# Patient Record
Sex: MALE | Race: BLACK OR AFRICAN AMERICAN | Employment: PART TIME | URBAN - METROPOLITAN AREA
[De-identification: names, ages, dates, MRNs, and addresses within clinical notes are randomized per-mention and may not be internally consistent; named-entity substitution may affect disease eponyms.]

---

## 2020-04-17 ENCOUNTER — HOSPITAL ENCOUNTER (EMERGENCY)
Age: 25
Discharge: HOME | End: 2020-04-17

## 2020-04-17 VITALS
RESPIRATION RATE: 16 BRPM | OXYGEN SATURATION: 98 % | SYSTOLIC BLOOD PRESSURE: 134 MMHG | DIASTOLIC BLOOD PRESSURE: 87 MMHG | BODY MASS INDEX: 35.84 KG/M2 | HEART RATE: 83 BPM | TEMPERATURE: 98.4 F | WEIGHT: 256 LBS | HEIGHT: 71 IN

## 2020-04-17 LAB
BACTERIA: NEGATIVE /HPF
BILIRUBIN URINE: NEGATIVE
BLOOD, URINE: ABNORMAL
CLARITY: CLEAR
COLOR: YELLOW
EPITHELIAL CELLS, UA: ABNORMAL /HPF (ref 0–5)
GLUCOSE URINE: NEGATIVE MG/DL
HYALINE CASTS: ABNORMAL /HPF (ref 0–5)
KETONES, URINE: NEGATIVE MG/DL
LEUKOCYTE ESTERASE, URINE: ABNORMAL
NITRITE, URINE: NEGATIVE
PH UA: 7 (ref 5–9)
PROTEIN UA: NEGATIVE MG/DL
RBC UA: ABNORMAL /HPF (ref 0–5)
SPECIFIC GRAVITY UA: 1.01 (ref 1–1.03)
URINE REFLEX TO CULTURE: YES
UROBILINOGEN, URINE: 1 E.U./DL
WBC UA: >100 /HPF (ref 0–5)

## 2020-04-17 PROCEDURE — 96372 THER/PROPH/DIAG INJ SC/IM: CPT

## 2020-04-17 PROCEDURE — 87491 CHLMYD TRACH DNA AMP PROBE: CPT

## 2020-04-17 PROCEDURE — 99282 EMERGENCY DEPT VISIT SF MDM: CPT

## 2020-04-17 PROCEDURE — 87086 URINE CULTURE/COLONY COUNT: CPT

## 2020-04-17 PROCEDURE — 6360000002 HC RX W HCPCS: Performed by: PHYSICIAN ASSISTANT

## 2020-04-17 PROCEDURE — 81001 URINALYSIS AUTO W/SCOPE: CPT

## 2020-04-17 PROCEDURE — 87661 TRICHOMONAS VAGINALIS AMPLIF: CPT

## 2020-04-17 PROCEDURE — 87591 N.GONORRHOEAE DNA AMP PROB: CPT

## 2020-04-17 PROCEDURE — 6370000000 HC RX 637 (ALT 250 FOR IP): Performed by: PHYSICIAN ASSISTANT

## 2020-04-17 RX ORDER — CIPROFLOXACIN 500 MG/1
500 TABLET, FILM COATED ORAL 2 TIMES DAILY
Qty: 20 TABLET | Refills: 0 | Status: SHIPPED | OUTPATIENT
Start: 2020-04-18 | End: 2020-04-28

## 2020-04-17 RX ORDER — CEFTRIAXONE SODIUM 250 MG/1
250 INJECTION, POWDER, FOR SOLUTION INTRAMUSCULAR; INTRAVENOUS ONCE
Status: COMPLETED | OUTPATIENT
Start: 2020-04-17 | End: 2020-04-17

## 2020-04-17 RX ORDER — METRONIDAZOLE 500 MG/1
2000 TABLET ORAL ONCE
Status: COMPLETED | OUTPATIENT
Start: 2020-04-17 | End: 2020-04-17

## 2020-04-17 RX ORDER — AZITHROMYCIN 250 MG/1
1000 TABLET, FILM COATED ORAL ONCE
Status: COMPLETED | OUTPATIENT
Start: 2020-04-17 | End: 2020-04-17

## 2020-04-17 RX ADMIN — CEFTRIAXONE SODIUM 250 MG: 250 INJECTION, POWDER, FOR SOLUTION INTRAMUSCULAR; INTRAVENOUS at 16:18

## 2020-04-17 RX ADMIN — AZITHROMYCIN MONOHYDRATE 1000 MG: 250 TABLET ORAL at 16:18

## 2020-04-17 RX ADMIN — METRONIDAZOLE 2000 MG: 500 TABLET, FILM COATED ORAL at 16:18

## 2020-04-17 ASSESSMENT — ENCOUNTER SYMPTOMS
GASTROINTESTINAL NEGATIVE: 1
RESPIRATORY NEGATIVE: 1
EYES NEGATIVE: 1

## 2020-04-17 NOTE — ED TRIAGE NOTES
Patient presents to the ER with concerns for a STD  Patient states that he had unprotected sex a few days ago and woke up with penile discharge and painful urination  Patient states that he had a STD in 2014 and he is presenting the same today.

## 2020-04-17 NOTE — ED NOTES
Patient given DC instructions education and scripts  Educated on proper use of medication  Up with steady gait at time of DC  Denies having any questions      Darryl Rankin RN  04/17/20 9683

## 2020-04-17 NOTE — ED PROVIDER NOTES
 Smokeless tobacco: Never Used   Substance and Sexual Activity    Alcohol use: Yes     Comment: socially    Drug use: Not Currently    Sexual activity: Yes     Partners: Female   Lifestyle    Physical activity     Days per week: None     Minutes per session: None    Stress: None   Relationships    Social connections     Talks on phone: None     Gets together: None     Attends Mandaen service: None     Active member of club or organization: None     Attends meetings of clubs or organizations: None     Relationship status: None    Intimate partner violence     Fear of current or ex partner: None     Emotionally abused: None     Physically abused: None     Forced sexual activity: None   Other Topics Concern    None   Social History Narrative    None       SCREENINGS      @FLOW([id number])@      PHYSICAL EXAM    (up to 7 for level 4, 8 or more for level 5)     ED Triage Vitals [04/17/20 1527]   BP Temp Temp Source Pulse Resp SpO2 Height Weight   134/87 98.4 °F (36.9 °C) Oral 83 16 98 % 5' 11\" (1.803 m) 256 lb (116.1 kg)       Physical Exam  Constitutional:       General: He is not in acute distress. Appearance: He is well-developed. HENT:      Head: Normocephalic and atraumatic. Eyes:      Conjunctiva/sclera: Conjunctivae normal.      Pupils: Pupils are equal, round, and reactive to light. Neck:      Musculoskeletal: Normal range of motion and neck supple. Cardiovascular:      Rate and Rhythm: Normal rate and regular rhythm. Heart sounds: No murmur. Pulmonary:      Effort: No respiratory distress. Breath sounds: Normal breath sounds. No wheezing or rales. Abdominal:      General: There is no distension. Palpations: Abdomen is soft. Tenderness: There is no abdominal tenderness. Genitourinary:     Comments: Exam deferred  Musculoskeletal: Normal range of motion. Skin:     General: Skin is warm and dry. Findings: No erythema or rash.    Neurological:      Mental Status: He is alert and oriented to person, place, and time. Cranial Nerves: No cranial nerve deficit. Psychiatric:         Judgment: Judgment normal.           All other labs were within normal range or not returned as of this dictation. EMERGENCY DEPARTMENT COURSE and DIFFERENTIALDIAGNOSIS/MDM:   Vitals:    Vitals:    04/17/20 1527   BP: 134/87   Pulse: 83   Resp: 16   Temp: 98.4 °F (36.9 °C)   TempSrc: Oral   SpO2: 98%   Weight: 256 lb (116.1 kg)   Height: 5' 11\" (1.803 m)        Patient checked for gonorrhea, chlamydia, and trichomonas. Patient treated with Rocephin, Zithromax, and Flagyl while in the emergency room. He is advised to avoid all sexual activity until symptoms resolve. Health department for HIV testing now and again in 6 months. UA reveals greater than 100 white cells. Patient will be written for Cipro to begin tomorrow. Follow-up with urology for reevaluation and treatment. Return here if symptoms worsen or if new concerning symptoms arise. He verbalized understanding of plan at discharge and has no further questions. PROCEDURES:  Unless otherwise noted below, none     Procedures      FINAL IMPRESSION      1. Urethritis    2.  Acute cystitis without hematuria          DISPOSITION/PLAN   DISPOSITION Decision To Discharge 04/17/2020 04:39:08 PM          Luanne Ibarra PA-C (electronically signed)  Attending Emergency Physician  [address] [place], [place] [address]WILIAN  04/17/20 6608

## 2020-04-19 LAB — URINE CULTURE, ROUTINE: NORMAL

## 2020-04-20 LAB
SPECIMEN SOURCE: NORMAL
T. VAGINALIS AMPLIFIED: NEGATIVE

## 2020-04-21 LAB
C. TRACHOMATIS DNA ,URINE: POSITIVE
N. GONORRHOEAE DNA, URINE: POSITIVE

## 2020-05-21 ENCOUNTER — HOSPITAL ENCOUNTER (EMERGENCY)
Age: 25
Discharge: HOME | End: 2020-05-21

## 2020-05-21 ENCOUNTER — APPOINTMENT (OUTPATIENT)
Dept: GENERAL RADIOLOGY | Age: 25
End: 2020-05-21

## 2020-05-21 VITALS
OXYGEN SATURATION: 96 % | WEIGHT: 256 LBS | HEART RATE: 124 BPM | HEIGHT: 72 IN | DIASTOLIC BLOOD PRESSURE: 74 MMHG | SYSTOLIC BLOOD PRESSURE: 120 MMHG | TEMPERATURE: 98.1 F | RESPIRATION RATE: 16 BRPM | BODY MASS INDEX: 34.67 KG/M2

## 2020-05-21 PROCEDURE — 73562 X-RAY EXAM OF KNEE 3: CPT

## 2020-05-21 PROCEDURE — 6370000000 HC RX 637 (ALT 250 FOR IP): Performed by: PHYSICIAN ASSISTANT

## 2020-05-21 PROCEDURE — 99283 EMERGENCY DEPT VISIT LOW MDM: CPT

## 2020-05-21 RX ORDER — ETODOLAC 400 MG/1
400 TABLET, FILM COATED ORAL 2 TIMES DAILY
Qty: 14 TABLET | Refills: 0 | Status: SHIPPED | OUTPATIENT
Start: 2020-05-21 | End: 2020-07-13

## 2020-05-21 RX ORDER — NAPROXEN 500 MG/1
500 TABLET ORAL ONCE
Status: COMPLETED | OUTPATIENT
Start: 2020-05-21 | End: 2020-05-21

## 2020-05-21 RX ADMIN — NAPROXEN 500 MG: 500 TABLET ORAL at 20:12

## 2020-05-21 ASSESSMENT — PAIN SCALES - GENERAL
PAINLEVEL_OUTOF10: 0
PAINLEVEL_OUTOF10: 9

## 2020-05-21 ASSESSMENT — PAIN DESCRIPTION - ORIENTATION: ORIENTATION: LEFT

## 2020-05-21 ASSESSMENT — ENCOUNTER SYMPTOMS
EYE PAIN: 0
APNEA: 0
SHORTNESS OF BREATH: 0
TROUBLE SWALLOWING: 0
COLOR CHANGE: 0
ALLERGIC/IMMUNOLOGIC NEGATIVE: 1
ABDOMINAL PAIN: 0

## 2020-05-21 ASSESSMENT — PAIN DESCRIPTION - LOCATION: LOCATION: KNEE

## 2020-05-21 ASSESSMENT — PAIN DESCRIPTION - DESCRIPTORS: DESCRIPTORS: ACHING

## 2020-05-21 ASSESSMENT — PAIN DESCRIPTION - PAIN TYPE: TYPE: ACUTE PAIN

## 2020-07-13 ENCOUNTER — HOSPITAL ENCOUNTER (OUTPATIENT)
Dept: ORTHOPEDIC SURGERY | Age: 25
Discharge: HOME | End: 2020-07-15

## 2020-07-13 ENCOUNTER — OFFICE VISIT (OUTPATIENT)
Dept: ORTHOPEDIC SURGERY | Age: 25
End: 2020-07-13

## 2020-07-13 VITALS
HEIGHT: 72 IN | TEMPERATURE: 97.5 F | BODY MASS INDEX: 34.67 KG/M2 | WEIGHT: 256 LBS | HEART RATE: 81 BPM | RESPIRATION RATE: 16 BRPM | OXYGEN SATURATION: 96 %

## 2020-07-13 PROCEDURE — 99203 OFFICE O/P NEW LOW 30 MIN: CPT | Performed by: ORTHOPAEDIC SURGERY

## 2020-07-13 PROCEDURE — 73564 X-RAY EXAM KNEE 4 OR MORE: CPT

## 2020-07-13 PROCEDURE — 73564 X-RAY EXAM KNEE 4 OR MORE: CPT | Performed by: ORTHOPAEDIC SURGERY

## 2020-07-13 RX ORDER — HYDROCODONE BITARTRATE AND ACETAMINOPHEN 5; 325 MG/1; MG/1
TABLET ORAL
COMMUNITY
Start: 2020-07-07 | End: 2020-12-16

## 2020-07-13 RX ORDER — IBUPROFEN 600 MG/1
TABLET ORAL
COMMUNITY
Start: 2020-07-07

## 2020-07-13 ASSESSMENT — ENCOUNTER SYMPTOMS
SHORTNESS OF BREATH: 0
ABDOMINAL PAIN: 0
SORE THROAT: 0

## 2020-07-13 NOTE — PROGRESS NOTES
file     Emotionally abused: Not on file     Physically abused: Not on file     Forced sexual activity: Not on file   Other Topics Concern    Not on file   Social History Narrative    Not on file     No family history on file. No Known Allergies  Current Outpatient Medications on File Prior to Visit   Medication Sig Dispense Refill    HYDROcodone-acetaminophen (NORCO) 5-325 MG per tablet take 1 tablet by mouth every 6 hours if needed for pain      ibuprofen (ADVIL;MOTRIN) 600 MG tablet take 1 tablet by mouth every 8 hours if needed for pain       No current facility-administered medications on file prior to visit. Review of Systems   Constitutional: Negative for fever. HENT: Negative for sore throat. Eyes: Negative for visual disturbance. Respiratory: Negative for shortness of breath. Cardiovascular: Negative for chest pain. Gastrointestinal: Negative for abdominal pain. Genitourinary: Negative for difficulty urinating. Skin: Negative for rash. Neurological: Negative for headaches. Hematological: Does not bruise/bleed easily. Objective:   Pulse 81   Temp 97.5 °F (36.4 °C) (Temporal)   Resp 16   Ht 5' 11.5\" (1.816 m)   Wt 256 lb (116.1 kg)   SpO2 96%   BMI 35.21 kg/m²     Ortho Exam  Is an oriented young man in the mild distress secondary to pain left knee plus effusion in the left knee. Range of motion -10 degrees terminal extension to flexion of 45. Significantly guarding the knee. There is pain of the patellar region. Pain on the medial lateral joint line. Anterior drawer has a slight laxity although is difficult to determine with a Lockman test because he is guarding a fair amount no posterior drawer. No MCL LCL laxity. There is global tenderness medially no palpable masses or cords posteriorly no calf tenderness negative roll test left hip.     Radiographs and Laboratory Studies:     Diagnostic Imaging Studies:    Xr Knee Left (min 4 Views)    Result Date:

## 2020-07-30 ENCOUNTER — HOSPITAL ENCOUNTER (OUTPATIENT)
Dept: MRI IMAGING | Age: 25
Discharge: HOME | End: 2020-08-01

## 2020-07-30 PROCEDURE — 73721 MRI JNT OF LWR EXTRE W/O DYE: CPT

## 2020-12-16 ENCOUNTER — HOSPITAL ENCOUNTER (EMERGENCY)
Age: 25
Discharge: HOME | End: 2020-12-16

## 2020-12-16 VITALS
SYSTOLIC BLOOD PRESSURE: 138 MMHG | RESPIRATION RATE: 16 BRPM | DIASTOLIC BLOOD PRESSURE: 86 MMHG | TEMPERATURE: 97.6 F | HEART RATE: 67 BPM | OXYGEN SATURATION: 97 % | HEIGHT: 71 IN | BODY MASS INDEX: 34.44 KG/M2 | WEIGHT: 246 LBS

## 2020-12-16 PROCEDURE — 87661 TRICHOMONAS VAGINALIS AMPLIF: CPT

## 2020-12-16 PROCEDURE — 2580000003 HC RX 258

## 2020-12-16 PROCEDURE — 6370000000 HC RX 637 (ALT 250 FOR IP): Performed by: NURSE PRACTITIONER

## 2020-12-16 PROCEDURE — 87591 N.GONORRHOEAE DNA AMP PROB: CPT

## 2020-12-16 PROCEDURE — 99283 EMERGENCY DEPT VISIT LOW MDM: CPT

## 2020-12-16 PROCEDURE — 87491 CHLMYD TRACH DNA AMP PROBE: CPT

## 2020-12-16 PROCEDURE — 6360000002 HC RX W HCPCS: Performed by: NURSE PRACTITIONER

## 2020-12-16 PROCEDURE — 96372 THER/PROPH/DIAG INJ SC/IM: CPT

## 2020-12-16 RX ORDER — AZITHROMYCIN 250 MG/1
1000 TABLET, FILM COATED ORAL ONCE
Status: COMPLETED | OUTPATIENT
Start: 2020-12-16 | End: 2020-12-16

## 2020-12-16 RX ORDER — METRONIDAZOLE 500 MG/1
2000 TABLET ORAL ONCE
Status: COMPLETED | OUTPATIENT
Start: 2020-12-16 | End: 2020-12-16

## 2020-12-16 RX ORDER — CEFTRIAXONE SODIUM 250 MG/1
250 INJECTION, POWDER, FOR SOLUTION INTRAMUSCULAR; INTRAVENOUS ONCE
Status: COMPLETED | OUTPATIENT
Start: 2020-12-16 | End: 2020-12-16

## 2020-12-16 RX ADMIN — CEFTRIAXONE SODIUM 250 MG: 250 INJECTION, POWDER, FOR SOLUTION INTRAMUSCULAR; INTRAVENOUS at 15:47

## 2020-12-16 RX ADMIN — METRONIDAZOLE 2000 MG: 500 TABLET ORAL at 15:47

## 2020-12-16 RX ADMIN — WATER 10 ML: 1 INJECTION INTRAMUSCULAR; INTRAVENOUS; SUBCUTANEOUS at 15:48

## 2020-12-16 RX ADMIN — AZITHROMYCIN MONOHYDRATE 1000 MG: 250 TABLET ORAL at 15:47

## 2020-12-16 ASSESSMENT — ENCOUNTER SYMPTOMS
COUGH: 0
SHORTNESS OF BREATH: 0
ABDOMINAL PAIN: 0
BACK PAIN: 0

## 2020-12-16 NOTE — DISCHARGE INSTR - COC
Continuity of Care Form    Patient Name: Catalina Lanza   :  1995  MRN:  [id number]    Admit date:  2020  Discharge date:  ***    Code Status Order: No Order   Advance Directives:     Admitting Physician:  No admitting provider for patient encounter. PCP: No primary care provider on file. Discharging Nurse: [facility] Unit/Room#:   Discharging Unit Phone Number: ***    Emergency Contact:   Extended Emergency Contact Information  Primary Emergency Contact: [address] [facility] Phone: [phone]  Relation: Other    Past Surgical History:  History reviewed. No pertinent surgical history. Immunization History: There is no immunization history on file for this patient. Active Problems: There is no problem list on file for this patient.       Isolation/Infection:   Isolation          No Isolation        Patient Infection Status     None to display          Nurse Assessment:  Last Vital Signs: /86   Pulse 67   Temp 97.6 °F (36.4 °C) (Oral)   Resp 16   Ht 5' 11\" (1.803 m)   Wt 246 lb (111.6 kg)   SpO2 97%   BMI 34.31 kg/m²     Last documented pain score (0-10 scale):    Last Weight:   Wt Readings from Last 1 Encounters:   20 246 lb (111.6 kg)     Mental Status:  {IP PT MENTAL STATUS:}    IV Access:  { FRANCO IV ACCESS:200480788}    Nursing Mobility/ADLs:  Walking   {CHP DME AEDY:967203461}  Transfer  {CHP DME HDPY:685520718}  Bathing  {CHP DME GLNL:736613836}  Dressing  {CHP DME CHRISTINE:009570963}  Toileting  {CHP DME XRIR:406525794}  Feeding  {CHP DME DMSN:798289655}  Med Admin  {CHP DME RDJD:985216531}  Med Delivery   { FRANCO MED Delivery:512779595}    Wound Care Documentation and Therapy:        Elimination:  Continence:   · Bowel: {YES / RT:51256}  · Bladder: {YES / FL:79633}  Urinary Catheter: {Urinary Catheter:502984670}   Colostomy/Ileostomy/Ileal Conduit: {YES / EX:10721}       Date of Last BM: ***  No intake or output data in the 24 hours ending 20 1526  No intake/output data recorded.     Safety Concerns:     [address] Safety Concerns:923061930}    Impairments/Disabilities:      [address] GAMEZ Impairments/Disabilities:167167776}    Nutrition Therapy:  Current Nutrition Therapy:   Lucas GAMEZ Diet List:426868030}    Routes of Feeding: {CHP DME Other Feedings:475516331}  Liquids: {Slp liquid thickness:54128}  Daily Fluid Restriction: {CHP DME Yes amt example:727158980}  Last Modified Barium Swallow with Video (Video Swallowing Test): {Done Not Done VJFQ:761773627}    Treatments at the Time of Hospital Discharge:   Respiratory Treatments: ***  Oxygen Therapy:  {Therapy; copd oxygen:56645}  Ventilator:    { CC Vent QSXE:376983477}    Rehab Therapies: {THERAPEUTIC INTERVENTION:4840190816}  Weight Bearing Status/Restrictions: Lucas Esquivel  Weight Bearin}  Other Medical Equipment (for information only, NOT a DME order):  {EQUIPMENT:868647444}  Other Treatments: ***    Patient's personal belongings (please select all that are sent with patient):  {CHP DME Belongings:318949598}    RN SIGNATURE:  {Esignature:597532451}    CASE MANAGEMENT/SOCIAL WORK SECTION    Inpatient Status Date: ***    Readmission Risk Assessment Score:  Readmission Risk              Risk of Unplanned Readmission:        0           Discharging to Facility/ Agency   · Name:   · Address:  · Phone:  · Fax:    Dialysis Facility (if applicable)   · Name:  · Address:  · Dialysis Schedule:  · Phone:  · Fax:    / signature: {Esignature:934227959}    PHYSICIAN SECTION    Prognosis: {Prognosis:6475205481}    Condition at Discharge: [address] Esquivel Patient Condition:458033846}    Rehab Potential (if transferring to Rehab): {Prognosis:5468743048}    Recommended Labs or Other Treatments After Discharge: ***    Physician Certification: I certify the above information and transfer of Dmitry Ling  is necessary for the continuing treatment of the diagnosis listed and that he requires {Admit to Appropriate Level of Care:19782} for {GREATER/LESS:846944704} 30 days.      Update Admission H&P: {CHP DME Changes in SKVDR:534530959}    PHYSICIAN SIGNATURE:  {Esignature:168615012}

## 2020-12-16 NOTE — ED TRIAGE NOTES
Patient here for STD testing, states his partner told him she has \"trich\". Patient denies any symptoms.

## 2020-12-16 NOTE — ED PROVIDER NOTES
[address] [facility] ED  eMERGENCY dEPARTMENT eNCOUnter      Pt Name: Rosalinda Moran  MRN: [id number]  [place] 1995  Date of evaluation: 12/16/2020  Provider: EPIFANIO De Los Santos CNP      HISTORY OF PRESENT ILLNESS    Rosalinda Moran is a 30 y.o. male who presents to the Emergency Department with concern for STD. Patient's girlfriend told him she had trichomonas. He denies any symptoms: penile drainage, penile lesion, dysuria, frequency. No pain. REVIEW OF SYSTEMS       Review of Systems   Constitutional: Negative for activity change and fever. HENT: Negative for congestion. Respiratory: Negative for cough and shortness of breath. Cardiovascular: Negative for chest pain. Gastrointestinal: Negative for abdominal pain. Genitourinary: Negative for decreased urine volume, difficulty urinating, discharge, dysuria, enuresis, frequency, genital sores, hematuria, penile pain, penile swelling, scrotal swelling, testicular pain and urgency. Musculoskeletal: Negative for arthralgias and back pain. Skin: Negative for rash. All other systems reviewed and are negative. PAST MEDICAL HISTORY   History reviewed. No pertinent past medical history. SURGICAL HISTORY     History reviewed. No pertinent surgical history. CURRENT MEDICATIONS       Previous Medications    IBUPROFEN (ADVIL;MOTRIN) 600 MG TABLET    take 1 tablet by mouth every 8 hours if needed for pain       ALLERGIES     Patient has no known allergies. FAMILY HISTORY     History reviewed. No pertinent family history.        SOCIAL HISTORY       Social History     Socioeconomic History    Marital status: Single     Spouse name: None    Number of children: None    Years of education: None    Highest education level: None   Occupational History    None   Social Needs    Financial resource strain: None    Food insecurity     Worry: None     Inability: None    Transportation needs     Medical: None     Non-medical: None Tobacco Use    Smoking status: Former Smoker    Smokeless tobacco: Never Used   Substance and Sexual Activity    Alcohol use: Yes     Comment: socially    Drug use: Not Currently    Sexual activity: Yes     Partners: Female   Lifestyle    Physical activity     Days per week: None     Minutes per session: None    Stress: None   Relationships    Social connections     Talks on phone: None     Gets together: None     Attends Roman Catholic service: None     Active member of club or organization: None     Attends meetings of clubs or organizations: None     Relationship status: None    Intimate partner violence     Fear of current or ex partner: None     Emotionally abused: None     Physically abused: None     Forced sexual activity: None   Other Topics Concern    None   Social History Narrative    None       SCREENINGS      @FLOW([id number])@      PHYSICAL EXAM    (up to 7 for level 4, 8 or more for level 5)     ED Triage Vitals [12/16/20 1421]   BP Temp Temp Source Pulse Resp SpO2 Height Weight   138/86 97.6 °F (36.4 °C) Oral 67 16 97 % 5' 11\" (1.803 m) 246 lb (111.6 kg)       Physical Exam  Vitals signs and nursing note reviewed. Constitutional:       Appearance: He is well-developed. HENT:      Head: Normocephalic and atraumatic. Right Ear: External ear normal.      Left Ear: External ear normal.   Eyes:      Conjunctiva/sclera: Conjunctivae normal.      Pupils: Pupils are equal, round, and reactive to light. Neck:      Musculoskeletal: Normal range of motion and neck supple. Cardiovascular:      Rate and Rhythm: Normal rate and regular rhythm. Pulmonary:      Effort: Pulmonary effort is normal.      Breath sounds: Normal breath sounds. Abdominal:      General: Bowel sounds are normal. There is no distension. Palpations: Abdomen is soft. Tenderness: There is no abdominal tenderness. Genitourinary:      Musculoskeletal: Normal range of motion.    Skin:     General: Skin is warm and dry.   Neurological:      Mental Status: He is alert and oriented to person, place, and time. Deep Tendon Reflexes: Reflexes are normal and symmetric. Psychiatric:         Judgment: Judgment normal.           All other labs were within normal range or not returned as of this dictation. EMERGENCY DEPARTMENT COURSE and DIFFERENTIALDIAGNOSIS/MDM:   Vitals:    Vitals:    12/16/20 1421   BP: 138/86   Pulse: 67   Resp: 16   Temp: 97.6 °F (36.4 °C)   TempSrc: Oral   SpO2: 97%   Weight: 246 lb (111.6 kg)   Height: 5' 11\" (1.803 m)            30 yr old male with STD exposure. F/U With PCP in 3 days for results. Patient verbalizes understanding. PROCEDURES:  Unless otherwise noted below, none     Procedures      FINAL IMPRESSION      1.  Exposure to sexually transmitted disease (STD)          DISPOSITION/PLAN   DISPOSITION Discharge - Pending Orders Complete 12/16/2020 02:56:55 PM          EPIFANIO Cornejo CNP (electronically signed)  Attending Emergency Physician     EPIFANIO Cornejo CNP  12/16/20 5048

## 2020-12-18 LAB
SPECIMEN SOURCE: NORMAL
T. VAGINALIS AMPLIFIED: NEGATIVE

## 2020-12-22 LAB
C. TRACHOMATIS DNA ,URINE: NEGATIVE
N. GONORRHOEAE DNA, URINE: NEGATIVE

## 2021-02-09 ENCOUNTER — HOSPITAL ENCOUNTER (EMERGENCY)
Age: 26
Discharge: HOME | End: 2021-02-09

## 2021-02-09 VITALS
WEIGHT: 254.8 LBS | OXYGEN SATURATION: 99 % | SYSTOLIC BLOOD PRESSURE: 132 MMHG | DIASTOLIC BLOOD PRESSURE: 68 MMHG | RESPIRATION RATE: 20 BRPM | TEMPERATURE: 98.7 F | BODY MASS INDEX: 39.99 KG/M2 | HEIGHT: 67 IN | HEART RATE: 73 BPM

## 2021-02-09 DIAGNOSIS — S89.92XA INJURY OF LEFT KNEE, INITIAL ENCOUNTER: ICD-10-CM

## 2021-02-09 DIAGNOSIS — N34.2 URETHRITIS: Primary | ICD-10-CM

## 2021-02-09 DIAGNOSIS — Z02.89 ENCOUNTER TO OBTAIN EXCUSE FROM WORK: ICD-10-CM

## 2021-02-09 PROCEDURE — 87591 N.GONORRHOEAE DNA AMP PROB: CPT

## 2021-02-09 PROCEDURE — 6360000002 HC RX W HCPCS: Performed by: STUDENT IN AN ORGANIZED HEALTH CARE EDUCATION/TRAINING PROGRAM

## 2021-02-09 PROCEDURE — 99283 EMERGENCY DEPT VISIT LOW MDM: CPT

## 2021-02-09 PROCEDURE — 2580000003 HC RX 258

## 2021-02-09 PROCEDURE — 96372 THER/PROPH/DIAG INJ SC/IM: CPT

## 2021-02-09 PROCEDURE — 87491 CHLMYD TRACH DNA AMP PROBE: CPT

## 2021-02-09 PROCEDURE — 6370000000 HC RX 637 (ALT 250 FOR IP): Performed by: STUDENT IN AN ORGANIZED HEALTH CARE EDUCATION/TRAINING PROGRAM

## 2021-02-09 RX ORDER — METRONIDAZOLE 500 MG/1
2000 TABLET ORAL ONCE
Status: COMPLETED | OUTPATIENT
Start: 2021-02-09 | End: 2021-02-09

## 2021-02-09 RX ORDER — CEFTRIAXONE SODIUM 250 MG/1
250 INJECTION, POWDER, FOR SOLUTION INTRAMUSCULAR; INTRAVENOUS ONCE
Status: COMPLETED | OUTPATIENT
Start: 2021-02-09 | End: 2021-02-09

## 2021-02-09 RX ORDER — AZITHROMYCIN 250 MG/1
1000 TABLET, FILM COATED ORAL ONCE
Status: COMPLETED | OUTPATIENT
Start: 2021-02-09 | End: 2021-02-09

## 2021-02-09 RX ADMIN — AZITHROMYCIN MONOHYDRATE 1000 MG: 250 TABLET ORAL at 18:39

## 2021-02-09 RX ADMIN — CEFTRIAXONE SODIUM 250 MG: 250 INJECTION, POWDER, FOR SOLUTION INTRAMUSCULAR; INTRAVENOUS at 18:39

## 2021-02-09 RX ADMIN — METRONIDAZOLE 2000 MG: 500 TABLET ORAL at 18:39

## 2021-02-09 RX ADMIN — WATER 10 ML: 1 INJECTION INTRAMUSCULAR; INTRAVENOUS; SUBCUTANEOUS at 18:40

## 2021-02-09 NOTE — ED TRIAGE NOTES
Patient arrived from home via self with complaint of having drainage from penis and needing a work note. patient A&OX4.

## 2021-02-10 ASSESSMENT — ENCOUNTER SYMPTOMS
ABDOMINAL PAIN: 0
SHORTNESS OF BREATH: 0
PHOTOPHOBIA: 0
WHEEZING: 0
VOMITING: 0
NAUSEA: 0
COUGH: 0

## 2021-02-11 NOTE — ED PROVIDER NOTES
[address] [facility] ED  EMERGENCY DEPARTMENT ENCOUNTER      Pt Name: Lenore Perez  MRN: [id number]  [place] 1995  Date of evaluation: 2/9/2021  Provider: Haley Forman PA-C    CHIEF COMPLAINT       Chief Complaint   Patient presents with    Exposure to STD     penile drainage.  Letter for School/Work         HISTORY OF PRESENT ILLNESS   (Location/Symptom, Timing/Onset, Context/Setting, Quality, Duration, Modifying Factors, Severity)  Note limiting factors. Lenore Perez is a 30 y.o. male who per chart review has no pmhx presents to the emergency department to obtain work excuse and with penile discharge, dysuria. Pt states that last week he injured his knee and had to call off work. His next shift is Friday and they told him he needed a letter to return. He states the knee feels completely better. He also reports thick, white penile discharge and dysuria for the last few days. 3 female partner, no protection. States his partner was tested a few days ago and is waiting results. No hx of similar. He denies fever chills abd or pelvic pain urinary urgency or frequency back or flank pain lesions pruritis erythema testicular pain blood in the urine. HPI    Nursing Notes were reviewed. REVIEW OF SYSTEMS    (2-9 systems for level 4, 10 or more for level 5)     Review of Systems   Constitutional: Negative for chills and fever. HENT: Negative for congestion. Eyes: Negative for photophobia. Respiratory: Negative for cough, shortness of breath and wheezing. Cardiovascular: Negative for chest pain and palpitations. Gastrointestinal: Negative for abdominal pain, nausea and vomiting. Genitourinary: Positive for discharge and dysuria. Negative for decreased urine volume, difficulty urinating, flank pain, frequency, hematuria, penile pain, penile swelling, scrotal swelling, testicular pain and urgency. Musculoskeletal: Negative for myalgias.    Allergic/Immunologic: Negative for immunocompromised state.   Neurological: Negative for dizziness, weakness and headaches. All other systems reviewed and are negative. Except as noted above the remainder of the review of systems was reviewed and negative. PAST MEDICAL HISTORY   History reviewed. No pertinent past medical history. SURGICAL HISTORY     History reviewed. No pertinent surgical history. CURRENT MEDICATIONS       Discharge Medication List as of 2/9/2021  6:37 PM      CONTINUE these medications which have NOT CHANGED    Details   ibuprofen (ADVIL;MOTRIN) 600 MG tablet take 1 tablet by mouth every 8 hours if needed for painHistorical Med             ALLERGIES     Patient has no known allergies. FAMILY HISTORY     History reviewed. No pertinent family history.        SOCIAL HISTORY       Social History     Socioeconomic History    Marital status: Single     Spouse name: None    Number of children: None    Years of education: None    Highest education level: None   Occupational History    None   Social Needs    Financial resource strain: None    Food insecurity     Worry: None     Inability: None    Transportation needs     Medical: None     Non-medical: None   Tobacco Use    Smoking status: Former Smoker    Smokeless tobacco: Never Used   Substance and Sexual Activity    Alcohol use: Yes     Comment: socially    Drug use: Not Currently    Sexual activity: Yes     Partners: Female   Lifestyle    Physical activity     Days per week: None     Minutes per session: None    Stress: None   Relationships    Social connections     Talks on phone: None     Gets together: None     Attends Sabianist service: None     Active member of club or organization: None     Attends meetings of clubs or organizations: None     Relationship status: None    Intimate partner violence     Fear of current or ex partner: None     Emotionally abused: None     Physically abused: None     Forced sexual activity: None   Other Topics Concern    None Social History Narrative    None       SCREENINGS                        PHYSICAL EXAM    (up to 7 for level 4, 8 or more for level 5)     ED Triage Vitals [02/09/21 1725]   BP Temp Temp Source Pulse Resp SpO2 Height Weight   132/68 98.7 °F (37.1 °C) Oral 73 20 99 % 5' 7\" (1.702 m) 254 lb 12.8 oz (115.6 kg)       Physical Exam  Constitutional:       General: He is not in acute distress. Appearance: He is well-developed. He is not ill-appearing, toxic-appearing or diaphoretic. HENT:      Head: Normocephalic and atraumatic. Nose: Nose normal.      Mouth/Throat:      Mouth: Mucous membranes are moist.   Eyes:      Pupils: Pupils are equal, round, and reactive to light. Neck:      Musculoskeletal: Normal range of motion. Cardiovascular:      Rate and Rhythm: Normal rate and regular rhythm. Heart sounds: No murmur. No friction rub. No gallop. Pulmonary:      Effort: Pulmonary effort is normal.      Breath sounds: Normal breath sounds. No wheezing or rales. Abdominal:      General: Bowel sounds are normal. There is no distension. Palpations: Abdomen is soft. Tenderness: There is no abdominal tenderness. Genitourinary:     Comments: Exam refused   Musculoskeletal:         General: No swelling. Left knee: Normal.   Skin:     General: Skin is warm and dry. Capillary Refill: Capillary refill takes less than 2 seconds. Findings: No rash. Neurological:      General: No focal deficit present. Mental Status: He is alert and oriented to person, place, and time.          DIAGNOSTIC RESULTS     EKG: All EKG's are interpreted by the Emergency Department Physician who either signs or Co-signs this chart in the absence of a cardiologist.      RADIOLOGY:   Non-plain film images such as CT, Ultrasound and MRI are read by the radiologist. Plain radiographic images are visualized and preliminarily interpreted by the emergency physician with the below findings:    Interpretation per the Radiologist below, if available at the time of this note:    No orders to display         ED BEDSIDE ULTRASOUND:   Performed by ED Physician - none    LABS:  Labs Reviewed   C.TRACHOMATIS N.GONORRHOEAE DNA, URINE       All other labs were within normal range or not returned as of this dictation. EMERGENCY DEPARTMENT COURSE and DIFFERENTIAL DIAGNOSIS/MDM:   Vitals:    Vitals:    02/09/21 1725   BP: 132/68   Pulse: 73   Resp: 20   Temp: 98.7 °F (37.1 °C)   TempSrc: Oral   SpO2: 99%   Weight: 254 lb 12.8 oz (115.6 kg)   Height: 5' 7\" (1.702 m)       MDM     Pt is a 29 yo M who presents to the ED with dysuria, penile discharge and to obtain work excuse. He is afebrile and hemodynamically stable. Pt given ppx STI treatment with IM rocephin, po azithro and po metronidazole in the ED. Urine GC pending. Pt tolerated abx well. Non toxic appearing with stable vitals, stable for discharge. Will be contacted with GC results. Encouraged to remain abstinent until results communicated and cleared by physician. Encouraged to have all partners tested and treated. F/u with pcp 1 week. Return to the ED for worsening sx, given warning signs for which he should return. Pt understands and agrees to plan, all questions answered. REASSESSMENT          CRITICAL CARE TIME   Total Critical Care time was 0 minutes, excluding separately reportable procedures. There was a high probability of clinically significant/life threatening deterioration in the patient's condition which required my urgent intervention. CONSULTS:  None    PROCEDURES:  Unless otherwise noted below, none     Procedures        FINAL IMPRESSION      1. Urethritis    2. Encounter to obtain excuse from work    3.  Injury of left knee, initial encounter          DISPOSITION/PLAN   DISPOSITION Decision To Discharge 02/09/2021 07:01:51 PM      PATIENT REFERRED TO:  [facility]) ED  [address]  [phone]  Go to   As needed,

## 2021-02-16 LAB
C. TRACHOMATIS DNA ,URINE: NEGATIVE
N. GONORRHOEAE DNA, URINE: POSITIVE

## 2022-09-22 DIAGNOSIS — M25.562 LEFT KNEE PAIN, UNSPECIFIED CHRONICITY: Primary | ICD-10-CM

## 2022-09-23 ENCOUNTER — OFFICE VISIT (OUTPATIENT)
Dept: ORTHOPEDIC SURGERY | Age: 27
End: 2022-09-23
Payer: COMMERCIAL

## 2022-09-23 VITALS — WEIGHT: 247 LBS | HEIGHT: 72 IN | BODY MASS INDEX: 33.46 KG/M2

## 2022-09-23 DIAGNOSIS — S83.512A CHRONIC RUPTURE OF ACL OF LEFT KNEE: Primary | ICD-10-CM

## 2022-09-23 PROCEDURE — 1036F TOBACCO NON-USER: CPT | Performed by: PHYSICIAN ASSISTANT

## 2022-09-23 PROCEDURE — 99203 OFFICE O/P NEW LOW 30 MIN: CPT | Performed by: PHYSICIAN ASSISTANT

## 2022-09-23 PROCEDURE — G8427 DOCREV CUR MEDS BY ELIG CLIN: HCPCS | Performed by: PHYSICIAN ASSISTANT

## 2022-09-23 PROCEDURE — G8419 CALC BMI OUT NRM PARAM NOF/U: HCPCS | Performed by: PHYSICIAN ASSISTANT

## 2022-09-23 ASSESSMENT — ENCOUNTER SYMPTOMS
VOMITING: 0
SHORTNESS OF BREATH: 0
COLOR CHANGE: 0
COUGH: 0

## 2022-09-23 NOTE — PROGRESS NOTES
[address] [facility]  [address] [place] [address] Rachel [address]  Dept: [phone]    Ambulatory Orthopedic New Patient Visit      CHIEF COMPLAINT:    Chief Complaint   Patient presents with    New Patient     LEFT KNEE PAIN , INITIAL injury 2019 -ACL TEAR / 2020- RE INJURED LEFT KNEE -  has now recently felt popping and pain       HISTORY OF PRESENT ILLNESS:      Date of Injury: 2019    The patient is a 32 y.o. male who is being seen  for consultation and evaluation of a left knee injury that occurred in 2019. He notes that he tore his ACL on the left knee in 2019 and did not have surgery for his knee at the time. He re-injured it in 2020 and had medical evaluation for the injury at [facility] in [facility]. After the MRI of his left knee in 2020 he did not have insurance to be able to have the surgery, therefore he continued with conservative management including OTC anti-inflammatories, exercising and rest when needed. Patient now notes that he works at [employer] as a  who supervises loading and unloading of semitruck's. He notes that there is episodes of instability within the left knee and he feels significant pain when he feels as if it is going to give out. He has not fallen because of the knee but notes that it is giving him discomfort he is unable to complete exercise or sport related activity like he did prior. Patient is interested in discussing surgical options for his left knee at this time. Past Medical History:    History reviewed. No pertinent past medical history. Past Surgical History:    History reviewed. No pertinent surgical history. Current Medications:   Current Outpatient Medications   Medication Sig Dispense Refill    ibuprofen (ADVIL;MOTRIN) 600 MG tablet take 1 tablet by mouth every 8 hours if needed for pain       No current facility-administered medications for this visit.        Allergies: Patient has no known allergies. Social History:   Social History     Socioeconomic History    Marital status: Single     Spouse name: Not on file    Number of children: Not on file    Years of education: Not on file    Highest education level: Not on file   Occupational History    Not on file   Tobacco Use    Smoking status: Former    Smokeless tobacco: Never   Substance and Sexual Activity    Alcohol use: Yes     Comment: socially    Drug use: Not Currently    Sexual activity: Yes     Partners: Female   Other Topics Concern    Not on file   Social History Narrative    Not on file     Social Determinants of Health     Financial Resource Strain: Not on file   Food Insecurity: Not on file   Transportation Needs: Not on file   Physical Activity: Not on file   Stress: Not on file   Social Connections: Not on file   Intimate Partner Violence: Not on file   Housing Stability: Not on file       Family History:  History reviewed. No pertinent family history. REVIEW OF SYSTEMS:  Review of Systems   Constitutional:  Negative for activity change and fever. HENT:  Negative for sneezing. Respiratory:  Negative for cough and shortness of breath. Cardiovascular:  Negative for chest pain. Gastrointestinal:  Negative for vomiting. Musculoskeletal:  Positive for arthralgias (left knee). Negative for joint swelling and myalgias. Skin:  Negative for color change. Neurological:  Negative for weakness and numbness. Psychiatric/Behavioral:  Negative for sleep disturbance. PHYSICAL EXAM:  Ht 6' (1.829 m)   Wt 247 lb (112 kg)   BMI 33.50 kg/m²  Body mass index is 33.5 kg/m². Physical Exam  Gen: alert and oriented  Psych:  Appropriate affect; Appropriate knowledge base; Appropriate mood; No hallucinations; Head: normocephalic, atraumatic   Chest: symmetric chest excursion  Pelvis: stable with ambulation  Ortho Exam  Extremity: Patient ambulates independently without a limp appreciated.   Evaluation of the Left knee reveals no significant outward deformity. There is no erythema, skin warmth, skin lesions, or signs of infection appreciated. There is tenderness over the medial joint line with palpation. There is a no knee effusion. Range of motion of the Left knee is full. No instability of the knee is appreciated at 0 and 30° of flexion. There is a positive anterior drawer and Lachman's test without a firm endpoint appreciated. There is increased pain with varus J Carlos's testing. No calf tenderness is noted. There is a negative hip log roll and Stinchfield test on the Left. Motor, sensory, vascular examination to the Left lower extremity is intact. Patient has full range of motion of the Left ankle. Radiology:  XR KNEE LEFT (MIN 4 VIEWS)    Result Date: 9/23/2022  History:   Left knee pain Findings:   Standing AP/Lateral/Tunnel/Merchant view xrays of the Left knee done in the office today shows mild  medial joint space narrowing. No evidence of fracture, subluxation, dislocation, radioopaque foreign body/tumor is noted. Impression:   Left knee mild approaching moderate degenerative changes as described above. ASSESSMENT:     1. Chronic rupture of ACL of left knee         PLAN:     Patient is here today for consultation and evaluation of the left knee injury sustained in 2019. The patient was previously found to have a ACL tear and a medial meniscus tear within the left knee on an MRI from 2020 and has had episodes of instability within the left knee since that time. The patient has not had surgery for the knee but is interested in discussing surgical intervention. The fact that the MRI is 3years old I ordered a repeat MRI of the left knee to see if there is any change or scarring down of ligaments that may not require surgery at this time. Patient was given a note to remain off work today and to return to work on 9/24/2022 without restrictions.      Patient was also given a left knee economy brace for comfort and support. He was instructed to continue activity as tolerated on the left lower extremity. He may take over-the-counter medication like Tylenol and or ibuprofen for any current discomfort. The patient will follow-up in our office after his left knee MRI for results review. He was instructed to call our office with any questions or concerns prior to his next appointment. He noted his understanding. Return for MRI results. Total Time: 40 min      No orders of the defined types were placed in this encounter. Orders Placed This Encounter   Procedures    MRI KNEE LEFT WO CONTRAST     Standing Status:   Future     Standing Expiration Date:   9/23/2023     Order Specific Question:   Reason for exam:     Answer:   known ACL ruture from 2020, updated imaging about extend of current healing       This note is created with the assistance of a speech recognition program.  While intending to generate a document that actually reflects the content of the visit, the document can still have some errors including those of syntax and sound a like substitutions which may escape proof reading. In such instances, actual meaning can be extrapolated by contextual diversion.      Electronically signed by Kayden Kim PA-C 9/23/2022 at 11:04 AM

## 2022-10-10 ENCOUNTER — HOSPITAL ENCOUNTER (OUTPATIENT)
Dept: MRI IMAGING | Facility: CLINIC | Age: 27
Discharge: HOME | End: 2022-10-12
Payer: COMMERCIAL

## 2022-10-10 DIAGNOSIS — S83.512A CHRONIC RUPTURE OF ACL OF LEFT KNEE: ICD-10-CM

## 2022-10-10 PROCEDURE — 73721 MRI JNT OF LWR EXTRE W/O DYE: CPT

## 2022-10-14 ENCOUNTER — OFFICE VISIT (OUTPATIENT)
Dept: ORTHOPEDIC SURGERY | Age: 27
End: 2022-10-14
Payer: COMMERCIAL

## 2022-10-14 VITALS — HEIGHT: 72 IN | WEIGHT: 237 LBS | BODY MASS INDEX: 32.1 KG/M2

## 2022-10-14 DIAGNOSIS — S83.511S SPRAIN OF ANTERIOR CRUCIATE LIGAMENT OF RIGHT KNEE, SEQUELA: ICD-10-CM

## 2022-10-14 DIAGNOSIS — S83.241A TEAR OF MEDIAL MENISCUS OF RIGHT KNEE, CURRENT, UNSPECIFIED TEAR TYPE, INITIAL ENCOUNTER: Primary | ICD-10-CM

## 2022-10-14 PROCEDURE — G8427 DOCREV CUR MEDS BY ELIG CLIN: HCPCS | Performed by: PHYSICIAN ASSISTANT

## 2022-10-14 PROCEDURE — G8484 FLU IMMUNIZE NO ADMIN: HCPCS | Performed by: PHYSICIAN ASSISTANT

## 2022-10-14 PROCEDURE — 99214 OFFICE O/P EST MOD 30 MIN: CPT | Performed by: PHYSICIAN ASSISTANT

## 2022-10-14 PROCEDURE — G8417 CALC BMI ABV UP PARAM F/U: HCPCS | Performed by: PHYSICIAN ASSISTANT

## 2022-10-14 PROCEDURE — 1036F TOBACCO NON-USER: CPT | Performed by: PHYSICIAN ASSISTANT

## 2022-10-14 RX ORDER — METHYLPREDNISOLONE 4 MG/1
TABLET ORAL
Qty: 1 KIT | Refills: 0 | Status: SHIPPED | OUTPATIENT
Start: 2022-10-14 | End: 2022-10-20

## 2022-10-14 NOTE — LETTER
[facility]  [address] [place] [id number]  Phone: [phone]  Fax: [phone]    Antonette Boo        October 14, 2022     Patient: Carey Servin   YOB: 1995   Date of Visit: 10/14/2022       To Whom It May Concern: It is my medical opinion that Carey Servin may return to work on 10/19/2022. If you have any questions or concerns, please don't hesitate to call.     Sincerely,        Deidra Small PA-C

## 2022-10-14 NOTE — PROGRESS NOTES
[address]  [address] [facility] [address]  Dept: [phone]  Dept Fax: [phone]        Ambulatory Follow Up      Subjective:   Zac Gallardo is a 32y.o. year old male who presents to our office today for routine followup regarding his   1. Tear of medial meniscus of right knee, current, unspecified tear type, initial encounter    2. Sprain of anterior cruciate ligament of right knee, sequela        Chief Complaint   Patient presents with    Results     MRI RESULTS LEFT KNEE       Date of Injury: 2019    HPI Zac Gallardo  is a 32 y.o.  male who presents today in follow for left knee pain due to an injury sustained in 2019. Patient notes that he tore his ACL in 2019 but did not have surgery at that time. He notes that he reinjured the left knee in 2020 and sought evaluation at [place] [address] in [facility]. He had an MRI of the left knee at that time but did not have insurance therefore he was unable to have surgery therefore he continued with conservative management including over-the-counter anti-inflammatories home exercises and rest when needed. Patient was last evaluated on 9/23/2022 and was continuing to have pain within the left knee. 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 He notes that there are continued episodes of instability within his left knee he feels significant pain within the left knee when it feels as if it is going to give out. A repeat MRI of the left knee was ordered at the previous appointment due to the fact that the prior MRI was 3years old. The patient is here today for results review. Patient was also given a left knee brace for comfort and support and was instructed to continue Tylenol or ibuprofen for his pain. Review of Systems   Constitutional:  Negative for activity change and fever.    HENT: Negative for sneezing. Respiratory:  Negative for cough and shortness of breath. Cardiovascular:  Negative for chest pain. Gastrointestinal:  Negative for vomiting. Musculoskeletal:  Positive for arthralgias (left knee). Negative for joint swelling and myalgias. Skin:  Negative for color change. Neurological:  Negative for weakness and numbness. Psychiatric/Behavioral:  Negative for sleep disturbance. Objective :   Ht 6' (1.829 m)   Wt 237 lb (107.5 kg)   BMI 32.14 kg/m²  Body mass index is 32.14 kg/m². General: Micah Lowe is a 32 y.o. male who is alert and oriented and sitting comfortably in our office. Ortho Exam  MS:  Patient ambulates independently without a limp appreciated. Evaluation of the Left knee reveals no significant outward deformity. There is no erythema, skin warmth, skin lesions, or signs of infection appreciated. There is tenderness over the medial joint line with palpation. There is a no knee effusion. Range of motion of the Left knee is full. No instability of the knee is appreciated at 0 and 30° of flexion. There is a mildly positive anterior drawer and Lachman's test with a firm endpoint appreciated. There is increased pain with varus J Carlos's testing. No calf tenderness is noted. There is a negative hip log roll and Stinchfield test on the Left. Motor, sensory, vascular examination to the Left lower extremity is intact. Patient has full range of motion of the Left ankle. Neuro: alert and oriented to person and place. Eyes: Extra-ocular muscles intact  Mouth: Oral mucosa moist. No perioral lesions  Pulm: Respirations unlabored and regular. Symmetric chest excursion without outward deformity is noted. Skin: warm, well perfused  Psych:   Patient has good fund of knowledge and displays understanging of exam, diagnosis, and plan.       Radiology:     XR KNEE LEFT (MIN 4 VIEWS)    Result Date: 9/26/2022  History:   Left knee pain Findings: Standing AP/Lateral/Tunnel/Merchant view xrays of the Left knee done in the office today shows mild  medial joint space narrowing. No evidence of fracture, subluxation, dislocation, radioopaque foreign body/tumor is noted. Impression:   Left knee mild approaching moderate degenerative changes as described above. MRI KNEE LEFT WO CONTRAST    Result Date: 10/11/2022  EXAMINATION: MRI OF THE LEFT KNEE WITHOUT CONTRAST, 10/10/2022 3:50 pm TECHNIQUE: Multiplanar multisequence MRI of the left knee was performed without the administration of intravenous contrast. COMPARISON: Left knee plain radiographs from 09/23/2022 HISTORY: ORDERING SYSTEM PROVIDED HISTORY: Chronic rupture of ACL of left knee TECHNOLOGIST PROVIDED HISTORY: Known ACL rupture from 2020, updated imaging about extend of current healing Reason for Exam: Patient states Hx ACL tear in left knee in 2019, residual left knee pain since. F/u ACL tear. 29-year-old male with chronic ACL rupture; residual left knee pain; follow-up. FINDINGS: MENISCI: Complex multidirectional tearing in the posterior horn and body of the medial meniscus with outward subluxation of the medial meniscus body. Lateral meniscus demonstrates normal morphology and signal characteristics. No lateral meniscus tear. CRUCIATE LIGAMENTS: Thinning of the ACL likely related to remote partial ACL tear. Posterior cruciate ligament appears intact. EXTENSOR MECHANISM: Distal quadriceps tendon, patellar tendon, and patellar retinacula appear intact. LATERAL COLLATERAL LIGAMENT COMPLEX: Popliteus muscle/tendon, iliotibial band, lateral collateral ligament, and biceps femoris appear intact. MEDIAL COLLATERAL LIGAMENT COMPLEX: Medial collateral ligament complex appears intact. KNEE JOINT: No sizable joint effusion. Osseous alignment is normal. No acute fracture or dislocation. Articular cartilage in all 3 compartments appears grossly intact without focal chondral defect.  BONE MARROW:  Bone marrow signal intensity within the visualized osseous structures is within normal limits. SOFT TISSUES: Visualized popliteal neurovascular bundle grossly unremarkable. No sizable Baker's cyst.  Mild circumferential edema in the subcutaneous fat about the knee. 1. Thinning of the ACL likely related to remote partial ACL tear. 2. Complex multidirectional tearing in the posterior horn and body of the medial meniscus with outward subluxation of the medial meniscus body. 3. No lateral meniscus tear. Assessment:      1. Tear of medial meniscus of right knee, current, unspecified tear type, initial encounter    2. Sprain of anterior cruciate ligament of right knee, sequela       Plan:      Patient is here today for left knee MRI results review. I personally reviewed the patient's left knee MRI images and impression with him today in office revealing thinning of his ACL likely related to a remote partial ACL tear with a medial meniscus tear. At this time the patient is to remain off work until 10/19/2022. He was given a prescribed Medrol Dosepak for continued left knee discomfort and inflammation. The patient may continue to wear the left knee brace as needed. The patient is to follow-up with Dr. Vandana Paige to discuss conservative treatment including outpatient physical therapy, possible injection, possible anti-inflammatory medication versus surgical intervention. He was instructed to call our office with any questions or concerns prior to his next appointment. He noted his understanding. Follow up:Return for re-evaluation on 11/21/2022 with Dr Augustine Phan in [address] [facility]. Total Time: 35 min      Orders Placed This Encounter   Medications    methylPREDNISolone (MEDROL DOSEPACK) 4 MG tablet     Sig: Take by mouth. Dispense:  1 kit     Refill:  0         No orders of the defined types were placed in this encounter.       This note is created with the assistance of a speech recognition program. While intending to generate a document that actually reflects the content of the visit, the document can still have some errors including those of syntax and sound a like substitutions which may escape proof reading. In such instances, actual meaning can be extrapolated by contextual diversion.      Electronically signed by Joyce Paul PA-C on 10/17/2022 at 2:22 PM

## 2022-10-17 ASSESSMENT — ENCOUNTER SYMPTOMS
SHORTNESS OF BREATH: 0
VOMITING: 0
COLOR CHANGE: 0
COUGH: 0

## 2022-11-21 ENCOUNTER — OFFICE VISIT (OUTPATIENT)
Dept: ORTHOPEDIC SURGERY | Age: 27
End: 2022-11-21
Payer: COMMERCIAL

## 2022-11-21 VITALS — HEIGHT: 72 IN | WEIGHT: 241 LBS | BODY MASS INDEX: 32.64 KG/M2

## 2022-11-21 DIAGNOSIS — S83.241A TEAR OF MEDIAL MENISCUS OF RIGHT KNEE, CURRENT, UNSPECIFIED TEAR TYPE, INITIAL ENCOUNTER: Primary | ICD-10-CM

## 2022-11-21 DIAGNOSIS — S83.512A CHRONIC RUPTURE OF ACL OF LEFT KNEE: ICD-10-CM

## 2022-11-21 PROCEDURE — G8427 DOCREV CUR MEDS BY ELIG CLIN: HCPCS | Performed by: ORTHOPAEDIC SURGERY

## 2022-11-21 PROCEDURE — G8417 CALC BMI ABV UP PARAM F/U: HCPCS | Performed by: ORTHOPAEDIC SURGERY

## 2022-11-21 PROCEDURE — 99213 OFFICE O/P EST LOW 20 MIN: CPT | Performed by: ORTHOPAEDIC SURGERY

## 2022-11-21 PROCEDURE — 1036F TOBACCO NON-USER: CPT | Performed by: ORTHOPAEDIC SURGERY

## 2022-11-21 PROCEDURE — G8484 FLU IMMUNIZE NO ADMIN: HCPCS | Performed by: ORTHOPAEDIC SURGERY

## 2022-11-22 ASSESSMENT — ENCOUNTER SYMPTOMS
SHORTNESS OF BREATH: 0
ABDOMINAL PAIN: 0
EYE DISCHARGE: 0
ROS SKIN COMMENTS: NEGATIVE FOR RASH

## 2022-11-23 NOTE — PROGRESS NOTES
[address] [place] [address] AND SPORTS MEDICINE  [facility] Enoch Quintana  [address] [place] [id number]  Dept: [phone]  Dept Fax: [phone]        Ambulatory Follow Up      Subjective:   Mary Kate Robin is a 32y.o. year old male who presents to our office today for routine followup regarding his   1. Tear of medial meniscus of right knee, current, unspecified tear type, initial encounter    2. Chronic rupture of ACL of left knee    . Chief Complaint   Patient presents with    Knee Injury     Follow up Left knee       HPI  Mary Kate Robin is a 78-year-old male who presents to office today for recheck. He has a chronic ACL tear sustained in 2019. He reinjured his knee in 2020. MRI previously has shown ACL tear and meniscal tear. The patient notes continued instability to his knee and is unable to do usual activities with sports like he would like to and as a result he is indicated he would like surgical intervention at this time. He has young children would like to be able to keep up with them without his knee feeling like it is giving out. Review of Systems   Constitutional:  Positive for activity change. Negative for fever. HENT:  Negative for dental problem. Eyes:  Negative for discharge. Respiratory:  Negative for shortness of breath. Cardiovascular:  Negative for chest pain. Gastrointestinal:  Negative for abdominal pain. Genitourinary: Negative. Musculoskeletal:  Positive for arthralgias. Skin:         Negative for rash   Neurological:  Positive for weakness. Psychiatric/Behavioral:  Negative for confusion. I have reviewed the CC, HPI, ROS, PMH, FHX, Social History, and if not present in this note, I have reviewed in the patient's chart. I agree with the documentation provided by other staff and have reviewed their documentation prior to providing my signature indicating agreement.     Objective :   Ht 6' (1.829 m)   Wt 241 lb (109.3 kg)   BMI 32.69 kg/m²  Body mass index is 32.69 kg/m². General: Mary Kate Robin is a 32 y.o. male who is alert and oriented and sitting comfortably in our office. Ortho Exam  MS: Patient ambulates without antalgia or short weightbearing phase to the right lower extremity. Positive anterior drawer is appreciated. Positive medial J Carlos's is appreciated. No gross instability of the right knee is otherwise noted. Negative hip logroll and Stinchfield test on the right is noted. Motor, sensory, vascular examination to the right lower extremity is grossly intact without focal deficits. Neuro: alert and oriented to person and place. Eyes: Extra-ocular muscles intact  Mouth: Oral mucosa moist. No perioral lesions  Pulm: Respirations unlabored and regular. Symmetric chest excursion without outward deformity is noted. Skin: warm, well perfused  Psych:   Patient has good fund of knowledge and displays understanging of exam, diagnosis, and plan. Radiology:     No results found. Assessment:      1. Tear of medial meniscus of right knee, current, unspecified tear type, initial encounter    2. Chronic rupture of ACL of left knee       Plan: We have discussed right knee arthroscopy with ACL reconstruction and partial medial meniscectomy versus repair. Patient would like to proceed with that. We have spoken about allograft versus autograft the patient would like to proceed with an allograft repair. The patient understands the lengthy rehab that will be involved. All details of the procedure, as well as risks, benefits and alternatives, including the option of non operative versus operative treatment were discussed.   The patient understands that risks of the surgery include but are not limited to: bleeding, malunion/nonunion, loss of fixation, loss of reduction, hardware failure, angular or rotational deformity, length discrepancy, limp, transfusion, skin blistering or breakdown, progressive post traumatic degenerative joint disease, possible need for further surgery, bone grafting, infection, nerve injury, paralysis, numbness, blood vessel injury, excessive scaring, wound complication or breakdown, failure of symptoms to improve or actual deterioration in condition, significant acute and/or chronic pain, possible need for amputation, permanent loss of motion, and permanent loss of function. As well as the general complications of anesthesia, which include but are not limited to: myocardial infarction and/or heart attack, stroke, multi organ system failure or even possible death, prolonged hospital stay, blood clots, pulmonary embolism, abnormal reaction to medication, visual and neurological disturbances, constipation, ischemic bowel, bowel obstruction, bowel perforation, ileus and mental status changes. No guarantees were made. Follow up:No follow-ups on file. No orders of the defined types were placed in this encounter. No orders of the defined types were placed in this encounter. This note is created with the assistance of a speech recognition program.  While intending to generate a document that actually reflects the content of the visit, the document can still have some errors including those of syntax and sound a like substitutions which may escape proof reading.   In such instances, actual meaning can be extrapolated by contextual diversion      Electronically signed by Tricia Enriquez DO, Lenoria Ping on 11/22/2022 at 9:54 PM

## 2022-12-08 DIAGNOSIS — Z98.890 STATUS POST SURGERY: ICD-10-CM

## 2022-12-08 DIAGNOSIS — S83.512A CHRONIC RUPTURE OF ACL OF LEFT KNEE: Primary | ICD-10-CM

## 2025-03-25 NOTE — ADDENDUM NOTE
Addended by: Johnson Hutchinson on: 7/13/2020 05:27 PM     Modules accepted: Orders What Type Of Note Output Would You Prefer (Optional)?: Standard Output Hpi Title: Evaluation of Skin Lesions How Severe Are Your Spot(S)?: moderate